# Patient Record
Sex: MALE | Race: ASIAN | NOT HISPANIC OR LATINO | Employment: FULL TIME | URBAN - METROPOLITAN AREA
[De-identification: names, ages, dates, MRNs, and addresses within clinical notes are randomized per-mention and may not be internally consistent; named-entity substitution may affect disease eponyms.]

---

## 2019-12-14 ENCOUNTER — OFFICE VISIT (OUTPATIENT)
Dept: URGENT CARE | Facility: CLINIC | Age: 29
End: 2019-12-14
Payer: COMMERCIAL

## 2019-12-14 VITALS
TEMPERATURE: 98.7 F | SYSTOLIC BLOOD PRESSURE: 116 MMHG | WEIGHT: 286 LBS | HEIGHT: 69 IN | DIASTOLIC BLOOD PRESSURE: 74 MMHG | RESPIRATION RATE: 18 BRPM | HEART RATE: 88 BPM | OXYGEN SATURATION: 95 % | BODY MASS INDEX: 42.36 KG/M2

## 2019-12-14 DIAGNOSIS — R05.9 COUGH: ICD-10-CM

## 2019-12-14 DIAGNOSIS — J22 LOWER RESPIRATORY INFECTION: ICD-10-CM

## 2019-12-14 PROCEDURE — 99203 OFFICE O/P NEW LOW 30 MIN: CPT | Performed by: NURSE PRACTITIONER

## 2019-12-14 RX ORDER — CEPHALEXIN 500 MG/1
500 CAPSULE ORAL 4 TIMES DAILY
COMMUNITY
End: 2020-10-15

## 2019-12-14 RX ORDER — BENZONATATE 100 MG/1
100 CAPSULE ORAL 3 TIMES DAILY PRN
Qty: 60 CAP | Refills: 0 | Status: SHIPPED | OUTPATIENT
Start: 2019-12-14

## 2019-12-14 RX ORDER — AZITHROMYCIN 250 MG/1
TABLET, FILM COATED ORAL
Qty: 6 TAB | Refills: 0 | Status: SHIPPED | OUTPATIENT
Start: 2019-12-14

## 2019-12-14 ASSESSMENT — ENCOUNTER SYMPTOMS
HEADACHES: 1
FEVER: 0
DIZZINESS: 0
SORE THROAT: 1
COUGH: 1
CHILLS: 0

## 2019-12-14 NOTE — PROGRESS NOTES
Subjective:      Omar Monsivais is a 29 y.o. male who presents with Cough (x4 days); Sore Throat; and Headache            Cough   This is a new problem. Episode onset: 4 days. The problem has been gradually worsening. The problem occurs every few minutes. The cough is productive of sputum (Yellow). Associated symptoms include headaches, postnasal drip and a sore throat. Pertinent negatives include no chills or fever. Associated symptoms comments: Patient was seen in the [facility] and placed on keflex for deep chest cough. States his cough is keeping him up at night and that he has a sore throat as well  . The symptoms are aggravated by lying down. The treatment provided no relief.       Review of Systems   Constitutional: Negative for chills and fever.   HENT: Positive for postnasal drip and sore throat.    Respiratory: Positive for cough.    Neurological: Positive for headaches. Negative for dizziness.     History reviewed. No pertinent past medical history. History reviewed. No pertinent surgical history.   Social History     Socioeconomic History   • Marital status: Single     Spouse name: Not on file   • Number of children: Not on file   • Years of education: Not on file   • Highest education level: Not on file   Occupational History   • Not on file   Social Needs   • Financial resource strain: Not on file   • Food insecurity:     Worry: Not on file     Inability: Not on file   • Transportation needs:     Medical: Not on file     Non-medical: Not on file   Tobacco Use   • Smoking status: Never Smoker   • Smokeless tobacco: Never Used   Substance and Sexual Activity   • Alcohol use: Not Currently   • Drug use: Never   • Sexual activity: Not on file   Lifestyle   • Physical activity:     Days per week: Not on file     Minutes per session: Not on file   • Stress: Not on file   Relationships   • Social connections:     Talks on phone: Not on file     Gets together: Not on file     Attends Quaker service: Not  "on file     Active member of club or organization: Not on file     Attends meetings of clubs or organizations: Not on file     Relationship status: Not on file   • Intimate partner violence:     Fear of current or ex partner: Not on file     Emotionally abused: Not on file     Physically abused: Not on file     Forced sexual activity: Not on file   Other Topics Concern   • Not on file   Social History Narrative   • Not on file    Patient has no known allergies.         Objective:     /74 (BP Location: Left arm, Patient Position: Sitting, BP Cuff Size: Adult long)   Pulse 88   Temp 37.1 °C (98.7 °F) (Temporal)   Resp 18   Ht 1.753 m (5' 9\")   Wt (!) 129.7 kg (286 lb)   SpO2 95%   BMI 42.23 kg/m²      Physical Exam  Vitals signs reviewed.   Constitutional:       Appearance: Normal appearance.   HENT:      Right Ear: Tympanic membrane, ear canal and external ear normal.      Left Ear: Ear canal and external ear normal.      Nose: Nose normal.      Mouth/Throat:      Mouth: Mucous membranes are moist.   Cardiovascular:      Rate and Rhythm: Normal rate and regular rhythm.      Heart sounds: Normal heart sounds.   Pulmonary:      Effort: Pulmonary effort is normal.      Breath sounds: Normal breath sounds.   Skin:     General: Skin is warm.   Neurological:      Mental Status: He is alert and oriented to person, place, and time.   Psychiatric:         Mood and Affect: Mood normal.         Behavior: Behavior normal.         Thought Content: Thought content normal.         Judgment: Judgment normal.                 Assessment/Plan:       1. Lower respiratory infection  - azithromycin (ZITHROMAX) 250 MG Tab; Take two tablets on the first day and then one tablet for the next four days  Dispense: 6 Tab; Refill: 0  - benzonatate (TESSALON) 100 MG Cap; Take 1 Cap by mouth 3 times a day as needed for Cough.  Dispense: 60 Cap; Refill: 0    2. Cough  - benzonatate (TESSALON) 100 MG Cap; Take 1 Cap by mouth 3 times a " day as needed for Cough.  Dispense: 60 Cap; Refill: 0    Discussed with patient the physical examination findings are likely of a lower respiratory infection.  Instructed patient that Keflex is not an adequate treatment for bacteria typically found in the lungs.  Will change antibiotic and instructed patient to stop taking Keflex.  Patient may also take over-the-counter NSAIDs and Tylenol per 's instructions and increase fluids as well as using throat lozenges and salt water gargles.    Instructed patient to return to clinic for worsening symptoms or symptoms that persist for 7 to 10 days  Supportive care, differential diagnoses, and indications for immediate follow-up discussed with patient.    Pathogenesis of diagnosis discussed including typical length and natural progression. Patient expresses understanding and agrees to plan.       Please note that this dictation was created using voice recognition software. I have made every reasonable attempt to correct obvious errors, but I expect that there are errors of grammar and possibly content that I did not discover before finalizing the note.

## 2020-10-15 ENCOUNTER — APPOINTMENT (OUTPATIENT)
Dept: RADIOLOGY | Facility: MEDICAL CENTER | Age: 30
End: 2020-10-15
Attending: EMERGENCY MEDICINE
Payer: COMMERCIAL

## 2020-10-15 ENCOUNTER — HOSPITAL ENCOUNTER (EMERGENCY)
Facility: MEDICAL CENTER | Age: 30
End: 2020-10-15
Attending: EMERGENCY MEDICINE
Payer: COMMERCIAL

## 2020-10-15 VITALS
SYSTOLIC BLOOD PRESSURE: 148 MMHG | WEIGHT: 296.3 LBS | BODY MASS INDEX: 43.89 KG/M2 | DIASTOLIC BLOOD PRESSURE: 77 MMHG | HEART RATE: 53 BPM | RESPIRATION RATE: 23 BRPM | OXYGEN SATURATION: 98 % | TEMPERATURE: 97.7 F | HEIGHT: 69 IN

## 2020-10-15 DIAGNOSIS — R19.7 DIARRHEA, UNSPECIFIED TYPE: ICD-10-CM

## 2020-10-15 DIAGNOSIS — R10.10 UPPER ABDOMINAL PAIN: ICD-10-CM

## 2020-10-15 LAB
ALBUMIN SERPL BCP-MCNC: 3.9 G/DL (ref 3.2–4.9)
ALBUMIN/GLOB SERPL: 1.1 G/DL
ALP SERPL-CCNC: 61 U/L (ref 30–99)
ALT SERPL-CCNC: 29 U/L (ref 2–50)
ANION GAP SERPL CALC-SCNC: 11 MMOL/L (ref 7–16)
AST SERPL-CCNC: 28 U/L (ref 12–45)
BASOPHILS # BLD AUTO: 1 % (ref 0–1.8)
BASOPHILS # BLD: 0.07 K/UL (ref 0–0.12)
BILIRUB SERPL-MCNC: 0.4 MG/DL (ref 0.1–1.5)
BUN SERPL-MCNC: 8 MG/DL (ref 8–22)
CALCIUM SERPL-MCNC: 10 MG/DL (ref 8.4–10.2)
CHLORIDE SERPL-SCNC: 103 MMOL/L (ref 96–112)
CO2 SERPL-SCNC: 24 MMOL/L (ref 20–33)
COVID ORDER STATUS COVID19: NORMAL
CREAT SERPL-MCNC: 0.85 MG/DL (ref 0.5–1.4)
EKG IMPRESSION: NORMAL
EOSINOPHIL # BLD AUTO: 0.19 K/UL (ref 0–0.51)
EOSINOPHIL NFR BLD: 2.6 % (ref 0–6.9)
ERYTHROCYTE [DISTWIDTH] IN BLOOD BY AUTOMATED COUNT: 39 FL (ref 35.9–50)
GLOBULIN SER CALC-MCNC: 3.7 G/DL (ref 1.9–3.5)
GLUCOSE SERPL-MCNC: 101 MG/DL (ref 65–99)
HCT VFR BLD AUTO: 47.3 % (ref 42–52)
HGB BLD-MCNC: 15.8 G/DL (ref 14–18)
IMM GRANULOCYTES # BLD AUTO: 0.02 K/UL (ref 0–0.11)
IMM GRANULOCYTES NFR BLD AUTO: 0.3 % (ref 0–0.9)
LACTATE BLD-SCNC: 1.5 MMOL/L (ref 0.5–2)
LIPASE SERPL-CCNC: 13 U/L (ref 7–58)
LYMPHOCYTES # BLD AUTO: 2.64 K/UL (ref 1–4.8)
LYMPHOCYTES NFR BLD: 36.3 % (ref 22–41)
MCH RBC QN AUTO: 28.5 PG (ref 27–33)
MCHC RBC AUTO-ENTMCNC: 33.4 G/DL (ref 33.7–35.3)
MCV RBC AUTO: 85.4 FL (ref 81.4–97.8)
MONOCYTES # BLD AUTO: 0.47 K/UL (ref 0–0.85)
MONOCYTES NFR BLD AUTO: 6.5 % (ref 0–13.4)
NEUTROPHILS # BLD AUTO: 3.89 K/UL (ref 1.82–7.42)
NEUTROPHILS NFR BLD: 53.3 % (ref 44–72)
NRBC # BLD AUTO: 0 K/UL
NRBC BLD-RTO: 0 /100 WBC
PLATELET # BLD AUTO: 226 K/UL (ref 164–446)
PMV BLD AUTO: 10.3 FL (ref 9–12.9)
POTASSIUM SERPL-SCNC: 4.1 MMOL/L (ref 3.6–5.5)
PROT SERPL-MCNC: 7.6 G/DL (ref 6–8.2)
RBC # BLD AUTO: 5.54 M/UL (ref 4.7–6.1)
SARS-COV-2 RNA RESP QL NAA+PROBE: NOTDETECTED
SODIUM SERPL-SCNC: 138 MMOL/L (ref 135–145)
SPECIMEN SOURCE: NORMAL
TROPONIN T SERPL-MCNC: <6 NG/L (ref 6–19)
WBC # BLD AUTO: 7.3 K/UL (ref 4.8–10.8)

## 2020-10-15 PROCEDURE — 99285 EMERGENCY DEPT VISIT HI MDM: CPT

## 2020-10-15 PROCEDURE — 71045 X-RAY EXAM CHEST 1 VIEW: CPT

## 2020-10-15 PROCEDURE — 700117 HCHG RX CONTRAST REV CODE 255: Performed by: EMERGENCY MEDICINE

## 2020-10-15 PROCEDURE — 93005 ELECTROCARDIOGRAM TRACING: CPT | Performed by: EMERGENCY MEDICINE

## 2020-10-15 PROCEDURE — 74177 CT ABD & PELVIS W/CONTRAST: CPT

## 2020-10-15 PROCEDURE — A9270 NON-COVERED ITEM OR SERVICE: HCPCS | Performed by: EMERGENCY MEDICINE

## 2020-10-15 PROCEDURE — U0003 INFECTIOUS AGENT DETECTION BY NUCLEIC ACID (DNA OR RNA); SEVERE ACUTE RESPIRATORY SYNDROME CORONAVIRUS 2 (SARS-COV-2) (CORONAVIRUS DISEASE [COVID-19]), AMPLIFIED PROBE TECHNIQUE, MAKING USE OF HIGH THROUGHPUT TECHNOLOGIES AS DESCRIBED BY CMS-2020-01-R: HCPCS

## 2020-10-15 PROCEDURE — 84484 ASSAY OF TROPONIN QUANT: CPT

## 2020-10-15 PROCEDURE — 80053 COMPREHEN METABOLIC PANEL: CPT

## 2020-10-15 PROCEDURE — 36415 COLL VENOUS BLD VENIPUNCTURE: CPT

## 2020-10-15 PROCEDURE — 83605 ASSAY OF LACTIC ACID: CPT

## 2020-10-15 PROCEDURE — 85025 COMPLETE CBC W/AUTO DIFF WBC: CPT

## 2020-10-15 PROCEDURE — C9803 HOPD COVID-19 SPEC COLLECT: HCPCS | Performed by: EMERGENCY MEDICINE

## 2020-10-15 PROCEDURE — 700102 HCHG RX REV CODE 250 W/ 637 OVERRIDE(OP): Performed by: EMERGENCY MEDICINE

## 2020-10-15 PROCEDURE — 96375 TX/PRO/DX INJ NEW DRUG ADDON: CPT

## 2020-10-15 PROCEDURE — 83690 ASSAY OF LIPASE: CPT

## 2020-10-15 PROCEDURE — 700111 HCHG RX REV CODE 636 W/ 250 OVERRIDE (IP): Performed by: EMERGENCY MEDICINE

## 2020-10-15 PROCEDURE — 700105 HCHG RX REV CODE 258: Performed by: EMERGENCY MEDICINE

## 2020-10-15 PROCEDURE — 96374 THER/PROPH/DIAG INJ IV PUSH: CPT

## 2020-10-15 RX ORDER — LOPERAMIDE HYDROCHLORIDE 2 MG/1
2 CAPSULE ORAL 2 TIMES DAILY PRN
COMMUNITY

## 2020-10-15 RX ORDER — FAMOTIDINE 20 MG/1
20 TABLET, FILM COATED ORAL 2 TIMES DAILY
Qty: 28 TAB | Refills: 0 | Status: SHIPPED | OUTPATIENT
Start: 2020-10-15 | End: 2020-10-29

## 2020-10-15 RX ORDER — SODIUM CHLORIDE 9 MG/ML
1000 INJECTION, SOLUTION INTRAVENOUS ONCE
Status: COMPLETED | OUTPATIENT
Start: 2020-10-15 | End: 2020-10-15

## 2020-10-15 RX ORDER — IBUPROFEN 200 MG
200-400 TABLET ORAL 2 TIMES DAILY PRN
COMMUNITY

## 2020-10-15 RX ORDER — ONDANSETRON 2 MG/ML
4 INJECTION INTRAMUSCULAR; INTRAVENOUS ONCE
Status: COMPLETED | OUTPATIENT
Start: 2020-10-15 | End: 2020-10-15

## 2020-10-15 RX ORDER — ONDANSETRON 4 MG/1
4 TABLET, ORALLY DISINTEGRATING ORAL EVERY 8 HOURS PRN
Qty: 6 TAB | Refills: 0 | Status: SHIPPED | OUTPATIENT
Start: 2020-10-15 | End: 2020-10-17

## 2020-10-15 RX ORDER — ONDANSETRON 4 MG/1
4 TABLET, ORALLY DISINTEGRATING ORAL EVERY 8 HOURS PRN
Qty: 6 TAB | Refills: 0 | Status: SHIPPED | OUTPATIENT
Start: 2020-10-15 | End: 2020-10-15 | Stop reason: SDUPTHER

## 2020-10-15 RX ORDER — CALCIUM CARBONATE 500 MG/1
1000 TABLET, CHEWABLE ORAL 4 TIMES DAILY PRN
COMMUNITY

## 2020-10-15 RX ADMIN — IOHEXOL 100 ML: 350 INJECTION, SOLUTION INTRAVENOUS at 07:52

## 2020-10-15 RX ADMIN — ONDANSETRON 4 MG: 2 INJECTION INTRAMUSCULAR; INTRAVENOUS at 07:26

## 2020-10-15 RX ADMIN — LIDOCAINE HYDROCHLORIDE 30 ML: 20 SOLUTION OROPHARYNGEAL at 08:32

## 2020-10-15 RX ADMIN — SODIUM CHLORIDE 1000 ML: 9 INJECTION, SOLUTION INTRAVENOUS at 07:26

## 2020-10-15 RX ADMIN — FAMOTIDINE 20 MG: 10 INJECTION INTRAVENOUS at 07:26

## 2020-10-15 ASSESSMENT — PAIN DESCRIPTION - PAIN TYPE: TYPE: ACUTE PAIN

## 2020-10-15 NOTE — ED NOTES
Discharged to home with instructions and prescriptions. Patient to self-isolate until covid test results. Patient will follow clear liquid diet, advance as tolerated. Patient to follow up with his doctor and/or return for worsening symptoms.

## 2020-10-15 NOTE — ED TRIAGE NOTES
"Pt here with c/o    Chief Complaint   Patient presents with   • Abdominal Pain     mid generalized abd pain since yesterday afternoon   • Nausea   • Diarrhea     5 times yesterday; blood tinged stool       /106   Pulse (!) 53   Temp 36.5 °C (97.7 °F) (Temporal)   Resp 19   Ht 1.753 m (5' 9\")   Wt (!) 134.4 kg (296 lb 4.8 oz)   SpO2 97%   BMI 43.76 kg/m²   "

## 2020-10-15 NOTE — ED NOTES
IV established with blood draw. Specimens to lab. Dr in to do gibson. Medicated for nausea and fluids infusing.

## 2020-10-15 NOTE — DISCHARGE INSTRUCTIONS
Clear liquid diet for the next 8 to 12 hours and slowly advance your diet as tolerated.  Avoid spicy food, alcohol, tobacco, caffeine, peppermint, acidic foods, and anything else that can irritate your stomach.  At this time we believe your abdominal pain is related to a gastritis/stomach irritation from excess acid.  It is also possible that your abdominal pain and diarrhea could be related to a viral GI bug or a foodborne illness.    Follow-up with your primary care physician within the next 1 to 2 days.  Please call for appointment.    Return to the ER for any worsening abdominal pain, change in abdominal pain, worsening chest pain, shortness of breath, cough, dark red blood from your rectum, black tarry stools, fevers over 100.4, shaking chills, vomiting, worsening diarrhea, or for any concerns.    Drink plenty of noncaffeinated, nonalcoholic beverages to stay hydrated.    You have been tested for COVID-19.  Please avoid contact with the public until you have your test results back.  Test results should be back within 24 hours.  You should also avoid contact with the public and self quarantine a home until you have been symptom-free for 48 to 72 hours.

## 2020-10-15 NOTE — Clinical Note
Omar Monsivais was seen and treated in our emergency department on 10/15/2020.  He may return to work on 10/18/2020.       If you have any questions or concerns, please don't hesitate to call.      Fabiola Blood M.D.

## 2020-10-15 NOTE — ED PROVIDER NOTES
"ED Provider Note  CHIEF COMPLAINT  Chief Complaint   Patient presents with   • Abdominal Pain     mid generalized abd pain since yesterday afternoon   • Nausea   • Diarrhea     5 times yesterday; blood tinged stool       KATE Monsivais is a 30 y.o. male who presents to the ER with complaints of diarrhea and abdominal pain.  The patient states that yesterday morning he started having diarrhea.  Around 6 PM last night he developed abdominal pain.  The pain began around the bellybutton area and has since radiated upwards towards the midepigastrium.  He says it further radiates up into the chest about an hour later.  He thought it was \"heartburn.\"  He took some Tums and that did seem to help a little bit.  He describes the pain as a \"cramping type pain.\"  He has had some nausea but no vomiting.  He tried to eat Chinese food last night but was unable to finish his meal as eating made the pain worse.  Today he had 2 pieces of bread and some water and that seemed to make the pain a little bit better.  He has had a total of 5 episodes of loose stool since onset of the diarrhea yesterday.  He says he had a couple speckles of bright red blood which he attributes to hemorrhoids.  No ill contacts.  Nobody at home or work has diarrhea.  No recent cough, cold or runny nose.  No fevers or chills.  He denies exposure to anybody was tested positive for COVID-19.  No shortness of breath.  No black tarry stools.  He says that over the last year he occasionally will have a little diarrhea here and there for which he takes Imodium.  However, he has never had this much diarrhea.  2 nights ago he had some leftover Chinese food, otherwise no bad food or water exposures as far as he knows.  He still has an appendix on the gallbladder.  No family history of any gallbladder problems as far as he knows.  Patient reports that the abdominal pain and lower chest pain has been constant unrelenting since onset last night.    REVIEW OF " SYSTEMS  See HPI for further details.  Positive for nausea, diarrhea, abdominal pain.  Negative for fever, chills, cough, runny nose, melena, vomiting, shortness of breath.  All other systems are negative.    PAST MEDICAL HISTORY  History reviewed. No pertinent past medical history.    FAMILY HISTORY  History reviewed. No pertinent family history.    SOCIAL HISTORY  Social History     Socioeconomic History   • Marital status: Single     Spouse name: Not on file   • Number of children: Not on file   • Years of education: Not on file   • Highest education level: Not on file   Occupational History   • Not on file   Social Needs   • Financial resource strain: Not on file   • Food insecurity     Worry: Not on file     Inability: Not on file   • Transportation needs     Medical: Not on file     Non-medical: Not on file   Tobacco Use   • Smoking status: Never Smoker   • Smokeless tobacco: Never Used   Substance and Sexual Activity   • Alcohol use: Not Currently   • Drug use: Never   • Sexual activity: Not on file   Lifestyle   • Physical activity     Days per week: Not on file     Minutes per session: Not on file   • Stress: Not on file   Relationships   • Social connections     Talks on phone: Not on file     Gets together: Not on file     Attends Evangelical service: Not on file     Active member of club or organization: Not on file     Attends meetings of clubs or organizations: Not on file     Relationship status: Not on file   • Intimate partner violence     Fear of current or ex partner: Not on file     Emotionally abused: Not on file     Physically abused: Not on file     Forced sexual activity: Not on file   Other Topics Concern   • Not on file   Social History Narrative   • Not on file       SURGICAL HISTORY  History reviewed. No pertinent surgical history.    CURRENT MEDICATIONS  Home Medications    **Home medications have not yet been reviewed for this encounter**         ALLERGIES  No Known Allergies    PHYSICAL  "EXAM  VITAL SIGNS: /106   Pulse (!) 53   Temp 36.5 °C (97.7 °F) (Temporal)   Resp 19   Ht 1.753 m (5' 9\")   Wt (!) 134.4 kg (296 lb 4.8 oz)   SpO2 97%   BMI 43.76 kg/m²      Constitutional: Well developed, well nourished; No acute distress; Non-toxic appearance.   HENT: Normocephalic, atraumatic; Bilateral external ears normal; Oropharynx with moist mucous membranes;  Eyes: PERRL, EOMI, Conjunctiva normal. No discharge.   Neck:  Supple, nontender midline; No stridor; No nuchal rigidity.   Lymphatic: No cervical lymphadenopathy noted.   Cardiovascular: Regular rate and rhythm without murmurs, rubs, or gallop.   Thorax & Lungs: No respiratory distress, breath sounds clear to auscultation bilaterally without wheezing, rales or rhonchi. Nontender chest wall. No crepitus or subcutaneous air  Abdomen: Soft, morbidly obese.  Tender to percussion and palpation in the left lower quadrant, left upper quadrant, midepigastrium.  No tenderness in the right lower quadrant or right upper quadrant.  Bowel sounds normal. No obvious masses; No pulsatile masses; no rebound, guarding, or peritoneal signs.  Rectal: No external hemorrhoids.  There is some light brown stool in the rectal vault.  Question small speckles of bright red blood in it.  Guaiac positive  Skin: Good color; warm and dry without rash or petechia.  Back: Nontender, No CVA tenderness.   Extremities: Distal radial, dorsalis pedis, posterior tibial pulses are equal bilaterally; No edema; Nontender calves or saphenous, No cyanosis, No clubbing.   Musculoskeletal: Good range of motion in all major joints. No tenderness to palpation or major deformities noted.   Neurologic: Alert & oriented x 4, clear speech      EKG  EKG time 629.  Normal sinus rhythm.  Rate of 53.  Normal axis.  Normal intervals.  No ST elevation or depression.    RADIOLOGY/PROCEDURES  CT-ABDOMEN-PELVIS WITH   Final Result      No acute abnormality in the abdomen or pelvis. No colonic " diverticula.      DX-CHEST-PORTABLE (1 VIEW)   Final Result      No acute cardiopulmonary abnormality.          COURSE & MEDICAL DECISION MAKING  Pertinent Labs & Imaging studies reviewed. (See chart for details)  Results for orders placed or performed during the hospital encounter of 10/15/20   CBC WITH DIFFERENTIAL   Result Value Ref Range    WBC 7.3 4.8 - 10.8 K/uL    RBC 5.54 4.70 - 6.10 M/uL    Hemoglobin 15.8 14.0 - 18.0 g/dL    Hematocrit 47.3 42.0 - 52.0 %    MCV 85.4 81.4 - 97.8 fL    MCH 28.5 27.0 - 33.0 pg    MCHC 33.4 (L) 33.7 - 35.3 g/dL    RDW 39.0 35.9 - 50.0 fL    Platelet Count 226 164 - 446 K/uL    MPV 10.3 9.0 - 12.9 fL    Neutrophils-Polys 53.30 44.00 - 72.00 %    Lymphocytes 36.30 22.00 - 41.00 %    Monocytes 6.50 0.00 - 13.40 %    Eosinophils 2.60 0.00 - 6.90 %    Basophils 1.00 0.00 - 1.80 %    Immature Granulocytes 0.30 0.00 - 0.90 %    Nucleated RBC 0.00 /100 WBC    Neutrophils (Absolute) 3.89 1.82 - 7.42 K/uL    Lymphs (Absolute) 2.64 1.00 - 4.80 K/uL    Monos (Absolute) 0.47 0.00 - 0.85 K/uL    Eos (Absolute) 0.19 0.00 - 0.51 K/uL    Baso (Absolute) 0.07 0.00 - 0.12 K/uL    Immature Granulocytes (abs) 0.02 0.00 - 0.11 K/uL    NRBC (Absolute) 0.00 K/uL   COMP METABOLIC PANEL   Result Value Ref Range    Sodium 138 135 - 145 mmol/L    Potassium 4.1 3.6 - 5.5 mmol/L    Chloride 103 96 - 112 mmol/L    Co2 24 20 - 33 mmol/L    Anion Gap 11.0 7.0 - 16.0    Glucose 101 (H) 65 - 99 mg/dL    Bun 8 8 - 22 mg/dL    Creatinine 0.85 0.50 - 1.40 mg/dL    Calcium 10.0 8.4 - 10.2 mg/dL    AST(SGOT) 28 12 - 45 U/L    ALT(SGPT) 29 2 - 50 U/L    Alkaline Phosphatase 61 30 - 99 U/L    Total Bilirubin 0.4 0.1 - 1.5 mg/dL    Albumin 3.9 3.2 - 4.9 g/dL    Total Protein 7.6 6.0 - 8.2 g/dL    Globulin 3.7 (H) 1.9 - 3.5 g/dL    A-G Ratio 1.1 g/dL   LIPASE   Result Value Ref Range    Lipase 13 7 - 58 U/L   TROPONIN   Result Value Ref Range    Troponin T <6 6 - 19 ng/L   LACTIC ACID   Result Value Ref Range    Lactic  "Acid 1.5 0.5 - 2.0 mmol/L   ESTIMATED GFR   Result Value Ref Range    GFR If African American >60 >60 mL/min/1.73 m 2    GFR If Non African American >60 >60 mL/min/1.73 m 2   EKG   Result Value Ref Range    Report       [facility] Emergency Dept.    Test Date:  2020-10-15  Pt Name:    TAY FITZGERALD             Department: [facility]  MRN:        [id number]                      [facility]  Gender:     Male                         Technician: JEN  :        1990                   Requested By:BRAXTON BLOOD  Order #:    [id number]                    Reading MD: Braxton Blood    Measurements  Intervals                                Axis  Rate:       53                           P:          -21  VT:         143                          QRS:        36  QRSD:       84                           T:          35  QT:         399  QTc:        375    Interpretive Statements  Sinus rhythm rate of 53  Normal axis  Normal intervals  No ST elevation or depression  No previous ECG available for comparison  Electronically Signed On 10- 7:32:29 PDT by Braxton Blood         Patient presents to the ER with complaint of diarrhea which began yesterday morning.  Around 6 PM last night he developed some abdominal pain around his bellybutton area.  The pain then radiated upward in the midepigastrium.  About an hour later he started noticing a little bit of chest pain in the lower portion of the chest.  He thought it was \"heartburn.\"  He took some Tums and it did seem to improve the pain a little bit, but the pain persisted all night long and was constant unrelenting throughout the night.  Patient was tender in the left lower quadrant, left upper quadrant, and midepigastrium.  There was no right upper quadrant or right lower quadrant tenderness.  There is no gallstones seen on CT scan.  At this time I have low suspicion for cholecystitis.  Additionally, gallbladder labs are normal.  No evidence of " "pancreatitis as the lipase is normal.  Patient reports eating some leftover Chinese food several days ago.  He admits that he reheated some seem rice.  He did not reheat any fried rice.  He has had about 5 episodes of loose diarrhea bowel movement since onset yesterday morning.  His last episode was about an hour prior to arrival.  He notes a little bit of bright red blood in the stool (some speckles).  The patient has no external hemorrhoids.  Perhaps he has some internal hemorrhoids as he did have some yellow-brown stool which stained guaiac positive.  He also describes some \"heartburn type sensation\".  He may have a gastritis.  He did get quite a bit of improvement with Zofran and Pepcid.  Repeat abdominal examination reveals a soft and fairly nontender abdominal examination upon recheck.  He said he still had a little bit of upper abdominal pain so he was given a GI cocktail.  At this time I suspect patient either has a viral GI syndrome.  He could have a slight foodborne illness as well, possibly from reheated Chinese food.  Since COVID-19 has presented with diarrhea, I did swab the patient for Covid.  He does not have any complaints of fevers, cough, myalgias, shortness of breath, or any other Covid-like complaints, but the test results will be back tomorrow patient has been told to self quarantine/self isolate until he gets his COVID-19 test back and until he is symptom-free for 72 hours.  In the meantime, patient will go home with Pepcid and Zofran.  He has been advised to avoid spicy foods, caffeine, alcohol, tobacco, and anything else irritates his stomach.  He has been also advised to follow clear liquid diet for the next 8 to 12 hours and then slowly advance as tolerated.  He was told if any blood in his stool becomes dark red in color, purple color, or black in color, he must return to the ER immediately.  This time he has a negative CT scan of his abdomen pelvis.  No evidence of colitis, " diverticulitis, appendicitis, cholecystitis, or any other dangerous intra-abdominal pathology.  He complained of a little bit of chest discomfort which started shortly after the upper abdominal pain.  I suspect this is radiating pain up from his abdomen.  The pain was constant unrelenting all night long.  He has a normal troponin and a negative EKG after 12 hours of constant chest pain.  At this time I do not think this is ACS.  Additionally, his symptoms are associated with diarrhea.  Clearly this seems to be more of a GI issue at this time.  Patient is safe and stable for outpatient management discharge home.  Is been given strict return precautions and discharge instructions and he understands treatment plan and follow-up.    Upon reevaluation at discharge, patient says he is pain-free and feeling much better.      I verified that the patient was wearing a mask and I was wearing appropriate PPE every time I entered the room. The patient's mask was on the patient at all times during my encounter except for a brief view of the oropharynx.    HYDRATION: Based on the patient's presentation of Dehydration the patient was given IV fluids. IV Hydration was used because oral hydration was not adequate alone. Upon recheck following hydration, the patient was improved.    FINAL IMPRESSION  1. Diarrhea, unspecified type Acute    2. Upper abdominal pain Acute         This dictation has been created using voice recognition software. The accuracy of the dictation is limited by the abilities of the software. I expect there may be some errors of grammar and possibly content. I made every attempt to manually correct the errors within my dictation. However, errors related to voice recognition software may still exist and should be interpreted within the appropriate context.    Electronically signed by: Fabiola Blood M.D., 10/15/2020 6:43 AM